# Patient Record
Sex: FEMALE | Race: WHITE | NOT HISPANIC OR LATINO | URBAN - NONMETROPOLITAN AREA
[De-identification: names, ages, dates, MRNs, and addresses within clinical notes are randomized per-mention and may not be internally consistent; named-entity substitution may affect disease eponyms.]

---

## 2016-05-03 PROBLEM — H52.223: Noted: 2019-04-03

## 2016-05-03 PROBLEM — H52.03: Noted: 2019-04-03

## 2016-05-03 PROBLEM — H52.4: Noted: 2019-04-03

## 2019-04-03 ENCOUNTER — IMPORTED ENCOUNTER (OUTPATIENT)
Dept: URBAN - NONMETROPOLITAN AREA CLINIC 1 | Facility: CLINIC | Age: 79
End: 2019-04-03

## 2019-04-03 PROCEDURE — 92015 DETERMINE REFRACTIVE STATE: CPT

## 2019-04-03 PROCEDURE — 92014 COMPRE OPH EXAM EST PT 1/>: CPT

## 2019-04-03 NOTE — PATIENT DISCUSSION
Cataract OU-Pt not yet symptomatic. -Reviewed symptoms of advancing cataract growth such as glare and halos and decreased vision.-Continue to monitor for now. Pt will notify us if any new symptoms develop. KATE-Explained KATE and associated symptoms.-Recommend increasing Omega 3s. -Recommend pt to begin artificial tears OU QID PRN. Pt will contact us if this does not provide relief. Consider punctal plugs in that case.-Sample of Refresh Optive given to pt. Use frequently for good lubrication. ok to continue Ocuvite daily.   RTC: 1yr DFE/Cat check

## 2020-03-05 ENCOUNTER — IMPORTED ENCOUNTER (OUTPATIENT)
Dept: URBAN - NONMETROPOLITAN AREA CLINIC 1 | Facility: CLINIC | Age: 80
End: 2020-03-05

## 2020-03-05 PROCEDURE — 92014 COMPRE OPH EXAM EST PT 1/>: CPT

## 2020-03-05 NOTE — PATIENT DISCUSSION
Cataract OU-Pt not yet symptomatic. -Reviewed symptoms of advancing cataract growth such as glare and halos and decreased vision.-Continue to monitor for now. Pt will notify us if any new symptoms develop. KATE-Explained KATE and associated symptoms.-Recommend increasing Omega 3s. -Recommend pt to begin artificial tears OU QID PRN. Pt will contact us if this does not provide relief. Consider punctal plugs in that case.-Sample of Refresh Optive given to pt. Use frequently for good lubrication. ok to continue Ocuvite daily.

## 2021-09-23 ENCOUNTER — IMPORTED ENCOUNTER (OUTPATIENT)
Dept: URBAN - NONMETROPOLITAN AREA CLINIC 1 | Facility: CLINIC | Age: 81
End: 2021-09-23

## 2021-09-23 PROBLEM — H52.4: Noted: 2021-09-23

## 2021-09-23 PROBLEM — H43.393: Noted: 2021-09-23

## 2021-09-23 PROBLEM — H52.03: Noted: 2021-09-23

## 2021-09-23 PROBLEM — H04.123: Noted: 2021-09-23

## 2021-09-23 PROCEDURE — 92015 DETERMINE REFRACTIVE STATE: CPT

## 2021-09-23 PROCEDURE — 92014 COMPRE OPH EXAM EST PT 1/>: CPT

## 2021-09-23 NOTE — PATIENT DISCUSSION
Cataract OU-Pt not yet symptomatic. -Reviewed symptoms of advancing cataract growth such as glare and halos and decreased vision.-Continue to monitor for now. Pt will notify us if any new symptoms develop. FloatersDiscussed  signs/symptoms of RD or tear. Discussed in detail the nature of floaters and to call if there is a sudden increase in their number. KATE-Explained KATE and associated symptoms.-Recommend increasing Omega 3s. -Recommend pt to begin artificial tears OU QID PRN. Pt will contact us if this does not provide relief. Consider punctal plugs in that case.-Sample of Refresh Optive given to pt. Use frequently for good lubrication. Presbyopia-Discussed diagnosis with patient. Updated spec Rx given. Recommend lens that will provide comfort as well as protect safety and health of eyes.

## 2022-04-16 ASSESSMENT — VISUAL ACUITY
OS_SC: 20/30
OD_SC: 20/30
OD_GLARE: 20/50
OD_SC: 20/20
OS_SC: 20/30-1
OS_SC: 20/20
OD_SC: 20/30-2
OS_CC: 20/200
OS_GLARE: 20/50

## 2022-04-16 ASSESSMENT — TONOMETRY
OS_IOP_MMHG: 16
OS_IOP_MMHG: 16
OD_IOP_MMHG: 16
OD_IOP_MMHG: 16
OD_IOP_MMHG: 17
OS_IOP_MMHG: 17